# Patient Record
Sex: FEMALE
[De-identification: names, ages, dates, MRNs, and addresses within clinical notes are randomized per-mention and may not be internally consistent; named-entity substitution may affect disease eponyms.]

---

## 2021-11-22 ENCOUNTER — APPOINTMENT (OUTPATIENT)
Dept: VASCULAR SURGERY | Facility: CLINIC | Age: 76
End: 2021-11-22
Payer: MEDICARE

## 2021-11-22 VITALS — DIASTOLIC BLOOD PRESSURE: 92 MMHG | SYSTOLIC BLOOD PRESSURE: 150 MMHG

## 2021-11-22 VITALS — SYSTOLIC BLOOD PRESSURE: 150 MMHG | DIASTOLIC BLOOD PRESSURE: 96 MMHG

## 2021-11-22 VITALS
HEIGHT: 60 IN | TEMPERATURE: 97.2 F | BODY MASS INDEX: 25.52 KG/M2 | DIASTOLIC BLOOD PRESSURE: 98 MMHG | WEIGHT: 130 LBS | SYSTOLIC BLOOD PRESSURE: 172 MMHG | HEART RATE: 77 BPM

## 2021-11-22 DIAGNOSIS — Z00.00 ENCOUNTER FOR GENERAL ADULT MEDICAL EXAMINATION W/OUT ABNORMAL FINDINGS: ICD-10-CM

## 2021-11-22 PROCEDURE — 99203 OFFICE O/P NEW LOW 30 MIN: CPT

## 2021-11-22 PROCEDURE — 93923 UPR/LXTR ART STDY 3+ LVLS: CPT

## 2021-11-22 NOTE — HISTORY OF PRESENT ILLNESS
[FreeTextEntry1] : Mrs. Scruggs is a former Dr. Alicea patient who presents for evaluation of left ankle varicose veins. She is 76 years old and previously underwent sclerotherapy, stab phlebectomy and EVLT by Dr. Love. She denies any history of leg edema, skin pigmentation changes or ulcers. She denies any history of DVT or SVT. She denies any symptoms of claudication, rest pain, or tissue loss. She does not presently wear compression stockings. She reports a scab which is 2-3mm over a reticular vein in the foot, which has been present for two weeks.\par \par She denies any history of CVA, TIA, MI, CAD, CRI or DM.\par \par PMH: thyroidectomy, bunionectomy, and cataracts\par \par Meds: Synthroid\par \par All: NKDA\par \par SH: non-smoker. Retired .\par \par FH: [place]

## 2021-11-22 NOTE — ASSESSMENT
[FreeTextEntry1] : In summary, Mrs. Scruggs presents with a history of lower extremity venous insufficiency. A venous duplex was performed in the office today, which showed no evidence of DVT or SVT. Left GSV appears closed. Right GSV is small. There are calf tributaries in the left leg. There are no large, prominent veins below the left foot scab. I instructed her to monitor this. It should resolve on its own. If it does not, if it grows in size or there are associated skin changes, I may refer her to a dermatologist for evaluation. \par \par I provided her a new rx for knee high 20-30 mmHg compression stockings and instructed her to wear these daily and remove at night.\par \par Patient's BP was 150-170/90s on exam. A call was placed to PCP Dr. Johnson, who is scheduled to see patient next week. No earlier appointments available. Patient declined evaluation in ED or urgent care.

## 2021-11-22 NOTE — PHYSICAL EXAM
[Normal Breath Sounds] : Normal breath sounds [Normal Heart Sounds] : normal heart sounds [2+] : left 2+ [de-identified] : NAD [de-identified] : WNL [FreeTextEntry1] : Small scab over reticular vein in medial left foot.